# Patient Record
Sex: FEMALE | Race: ASIAN | NOT HISPANIC OR LATINO | Employment: FULL TIME | URBAN - METROPOLITAN AREA
[De-identification: names, ages, dates, MRNs, and addresses within clinical notes are randomized per-mention and may not be internally consistent; named-entity substitution may affect disease eponyms.]

---

## 2023-10-12 ENCOUNTER — TELEPHONE (OUTPATIENT)
Dept: OBSTETRICS AND GYNECOLOGY | Facility: CLINIC | Age: 28
End: 2023-10-12
Payer: COMMERCIAL

## 2023-10-12 NOTE — TELEPHONE ENCOUNTER
Patient called asking for an appointment on 10/13/23. There was nothing available. She has a bartholin cyst and has been doing warm compression 3-4 times a day for 1 day, with no relief. She is seeking advice to find out if an antibiotic would be recommended.

## 2023-10-13 ENCOUNTER — APPOINTMENT (OUTPATIENT)
Dept: OBSTETRICS AND GYNECOLOGY | Facility: CLINIC | Age: 28
End: 2023-10-13
Payer: COMMERCIAL

## 2023-10-13 ENCOUNTER — HOSPITAL ENCOUNTER (EMERGENCY)
Facility: HOSPITAL | Age: 28
Discharge: HOME | End: 2023-10-13
Payer: COMMERCIAL

## 2023-10-13 VITALS
OXYGEN SATURATION: 99 % | HEIGHT: 64 IN | DIASTOLIC BLOOD PRESSURE: 86 MMHG | RESPIRATION RATE: 20 BRPM | BODY MASS INDEX: 21.34 KG/M2 | SYSTOLIC BLOOD PRESSURE: 115 MMHG | HEART RATE: 86 BPM | WEIGHT: 125 LBS | TEMPERATURE: 98.4 F

## 2023-10-13 DIAGNOSIS — N75.0 BARTHOLIN CYST: Primary | ICD-10-CM

## 2023-10-13 PROCEDURE — 99283 EMERGENCY DEPT VISIT LOW MDM: CPT

## 2023-10-13 RX ORDER — SULFAMETHOXAZOLE AND TRIMETHOPRIM 800; 160 MG/1; MG/1
1 TABLET ORAL 2 TIMES DAILY
Qty: 20 TABLET | Refills: 0 | Status: SHIPPED | OUTPATIENT
Start: 2023-10-13 | End: 2023-10-23

## 2023-10-13 RX ORDER — CEPHALEXIN 500 MG/1
500 CAPSULE ORAL 4 TIMES DAILY
Qty: 40 CAPSULE | Refills: 0 | Status: SHIPPED | OUTPATIENT
Start: 2023-10-13 | End: 2023-10-23

## 2023-10-13 ASSESSMENT — PAIN DESCRIPTION - PAIN TYPE: TYPE: ACUTE PAIN

## 2023-10-13 ASSESSMENT — COLUMBIA-SUICIDE SEVERITY RATING SCALE - C-SSRS
1. IN THE PAST MONTH, HAVE YOU WISHED YOU WERE DEAD OR WISHED YOU COULD GO TO SLEEP AND NOT WAKE UP?: NO
2. HAVE YOU ACTUALLY HAD ANY THOUGHTS OF KILLING YOURSELF?: NO
6. HAVE YOU EVER DONE ANYTHING, STARTED TO DO ANYTHING, OR PREPARED TO DO ANYTHING TO END YOUR LIFE?: NO

## 2023-10-13 ASSESSMENT — PAIN - FUNCTIONAL ASSESSMENT: PAIN_FUNCTIONAL_ASSESSMENT: 0-10

## 2023-10-13 ASSESSMENT — PAIN DESCRIPTION - FREQUENCY: FREQUENCY: CONSTANT/CONTINUOUS

## 2023-10-13 ASSESSMENT — PAIN DESCRIPTION - LOCATION: LOCATION: VAGINA

## 2023-10-13 ASSESSMENT — PAIN DESCRIPTION - DESCRIPTORS: DESCRIPTORS: SHARP

## 2023-10-13 ASSESSMENT — PAIN SCALES - GENERAL: PAINLEVEL_OUTOF10: 8

## 2023-10-13 NOTE — ED PROVIDER NOTES
HPI   Chief Complaint   Patient presents with    Cyst       This is a 28-year-old female presenting to the emergency room with complaints of a Bartholin cyst to the left labia majora.  The patient reports that it developed over the last couple of days.  Is gotten progressively larger in size and more uncomfortable.  It hurts particularly when she is walking.  She had an appointment with OB/GYN this morning, however the pain was so intense that she cannot make it to the appointment.  She has been applying compresses to the affected area.  The patient reports that she has had these before in the past.  She has had incision and drainage in the past and has taken antibiotics.  She denies any history of STDs.  She denies shaving in that area or using any particular creams or salves.  She reports she has not been sexually active.  Patient denies any fever, chills, nausea, or vomiting.      History provided by:  Patient   used: No                        No data recorded                Patient History   No past medical history on file.  No past surgical history on file.  No family history on file.  Social History     Tobacco Use    Smoking status: Not on file    Smokeless tobacco: Not on file   Substance Use Topics    Alcohol use: Not on file    Drug use: Not on file       Physical Exam   ED Triage Vitals [10/13/23 0948]   Temp Heart Rate Resp BP   36.8 °C (98.2 °F) 90 18 113/72      SpO2 Temp Source Heart Rate Source Patient Position   99 % Temporal Monitor Standing      BP Location FiO2 (%)     Left arm --       Physical Exam  Constitutional:       Appearance: She is normal weight.   HENT:      Head: Normocephalic.      Right Ear: External ear normal.      Left Ear: External ear normal.      Nose: Nose normal.      Mouth/Throat:      Pharynx: Oropharynx is clear.   Eyes:      Extraocular Movements: Extraocular movements intact.      Pupils: Pupils are equal, round, and reactive to light.    Cardiovascular:      Rate and Rhythm: Normal rate and regular rhythm.      Pulses: Normal pulses.      Heart sounds: Normal heart sounds.   Pulmonary:      Effort: Pulmonary effort is normal.      Breath sounds: Normal breath sounds.   Abdominal:      General: Bowel sounds are normal.      Palpations: Abdomen is soft.      Tenderness: There is no abdominal tenderness.   Genitourinary:     Comments: The patient has a indurated area measuring approximately 2 cm x 1 cm to the left labia majora.  There is a small amount of fluctuance noted to the center.  It is tender to palpation.  No surrounding cellulitis.  Musculoskeletal:         General: Normal range of motion.   Skin:     General: Skin is warm.   Neurological:      General: No focal deficit present.      Mental Status: She is alert and oriented to person, place, and time.   Psychiatric:         Mood and Affect: Mood normal.         ED Course & MDM   Diagnoses as of 10/13/23 1213   Bartholin cyst       Medical Decision Making  The patient was seen and examined by the nurse practitioner, Jazmin Zacarias.  The patient has a large Bartholin noted to the left labia majora.  There is mild fluctuance.  We discussed treatment modalities.  The patient requested that we not perform an incision and drainage.  She reported that she had a significant amount of pain with her last incision and drainage.  The patient wants to try antibiotics.  We did stress that the best therapy is incision and drainage.  We are willing to provide antibiotics.  We will provide a prescription for Bactrim and Keflex for home administration.  She is to follow-up with her OB/GYN on Monday.  She is to apply warm compresses to the affected area and she will use Tylenol and/or Motrin over-the-counter as needed for pain.  The patient denied the need for stronger pain medication.  The patient was given strict return precautions.  She is to return to the emergency room if he gets worse in that amount of  time.  She was told that she will probably need incision and drainage if the antibiotics do not work appropriately.  She verbalized understanding of these instructions.  The patient was discharged in stable condition with computer discharge instructions given.        Procedure  Procedures     Jazmin Zacarias, APRN-DIAN  10/13/23 1216

## 2023-10-16 ENCOUNTER — LAB (OUTPATIENT)
Dept: LAB | Facility: LAB | Age: 28
End: 2023-10-16
Payer: COMMERCIAL

## 2023-10-16 ENCOUNTER — OFFICE VISIT (OUTPATIENT)
Dept: OBSTETRICS AND GYNECOLOGY | Facility: CLINIC | Age: 28
End: 2023-10-16
Payer: COMMERCIAL

## 2023-10-16 VITALS
SYSTOLIC BLOOD PRESSURE: 96 MMHG | WEIGHT: 138 LBS | HEIGHT: 63 IN | DIASTOLIC BLOOD PRESSURE: 64 MMHG | BODY MASS INDEX: 24.45 KG/M2

## 2023-10-16 DIAGNOSIS — Z01.419 WELL WOMAN EXAM: Primary | ICD-10-CM

## 2023-10-16 DIAGNOSIS — Z11.51 SCREENING FOR HPV (HUMAN PAPILLOMAVIRUS): ICD-10-CM

## 2023-10-16 DIAGNOSIS — N75.0 BARTHOLIN CYST: ICD-10-CM

## 2023-10-16 DIAGNOSIS — Z11.3 SCREENING FOR STD (SEXUALLY TRANSMITTED DISEASE): ICD-10-CM

## 2023-10-16 DIAGNOSIS — Z01.419 WELL WOMAN EXAM: ICD-10-CM

## 2023-10-16 DIAGNOSIS — Z12.4 CERVICAL CANCER SCREENING: ICD-10-CM

## 2023-10-16 LAB
ERYTHROCYTE [DISTWIDTH] IN BLOOD BY AUTOMATED COUNT: 12.7 % (ref 11.5–14.5)
HCT VFR BLD AUTO: 38.1 % (ref 36–46)
HGB BLD-MCNC: 12.1 G/DL (ref 12–16)
MCH RBC QN AUTO: 28.6 PG (ref 26–34)
MCHC RBC AUTO-ENTMCNC: 31.8 G/DL (ref 32–36)
MCV RBC AUTO: 90 FL (ref 80–100)
NRBC BLD-RTO: 0 /100 WBCS (ref 0–0)
PLATELET # BLD AUTO: 302 X10*3/UL (ref 150–450)
PMV BLD AUTO: 11.1 FL (ref 7.5–11.5)
RBC # BLD AUTO: 4.23 X10*6/UL (ref 4–5.2)
TSH SERPL-ACNC: 1.15 MIU/L (ref 0.44–3.98)
WBC # BLD AUTO: 5.6 X10*3/UL (ref 4.4–11.3)

## 2023-10-16 PROCEDURE — 88175 CYTOPATH C/V AUTO FLUID REDO: CPT

## 2023-10-16 PROCEDURE — 87389 HIV-1 AG W/HIV-1&-2 AB AG IA: CPT

## 2023-10-16 PROCEDURE — 84443 ASSAY THYROID STIM HORMONE: CPT

## 2023-10-16 PROCEDURE — 87624 HPV HI-RISK TYP POOLED RSLT: CPT

## 2023-10-16 PROCEDURE — 99213 OFFICE O/P EST LOW 20 MIN: CPT | Performed by: STUDENT IN AN ORGANIZED HEALTH CARE EDUCATION/TRAINING PROGRAM

## 2023-10-16 PROCEDURE — 36415 COLL VENOUS BLD VENIPUNCTURE: CPT

## 2023-10-16 PROCEDURE — 87661 TRICHOMONAS VAGINALIS AMPLIF: CPT

## 2023-10-16 PROCEDURE — 87800 DETECT AGNT MULT DNA DIREC: CPT

## 2023-10-16 PROCEDURE — 85027 COMPLETE CBC AUTOMATED: CPT

## 2023-10-16 PROCEDURE — 1036F TOBACCO NON-USER: CPT | Performed by: STUDENT IN AN ORGANIZED HEALTH CARE EDUCATION/TRAINING PROGRAM

## 2023-10-16 NOTE — ASSESSMENT & PLAN NOTE
-Doing well on Abx. Discussed Word catheter as this is a recurrent issue and needs more definitive management. Pt will call and come in if recurrence occurs.

## 2023-10-16 NOTE — PROGRESS NOTES
Subjective   Patient ID: Eryn Tay is a 28 y.o. female who presents for established obgyn visit (ER 10-13-23 Bartholin cyst /Patient is on antibiotics and feeling better ).  Pt seen in ED for Bartholin cyst. This is a recurrent issue. She was seen and placed on Abx. The cyst spontaneously drained 2 days ago. She has complete resolution of pain and symptoms. No fevers, chills. No HA/vision changes. No RUQ pain, n/v. No chest pain or SOB. No calf pain.          Review of Systems   All other systems reviewed and are negative.    History reviewed. No pertinent past medical history.  History reviewed. No pertinent surgical history.  Social History     Socioeconomic History    Marital status: Single     Spouse name: Not on file    Number of children: Not on file    Years of education: Not on file    Highest education level: Not on file   Occupational History    Not on file   Tobacco Use    Smoking status: Never    Smokeless tobacco: Never   Substance and Sexual Activity    Alcohol use: Yes     Comment: occasional    Drug use: Never    Sexual activity: Not on file   Other Topics Concern    Not on file   Social History Narrative    Not on file     Social Determinants of Health     Financial Resource Strain: Not on file   Food Insecurity: Not on file   Transportation Needs: Not on file   Physical Activity: Not on file   Stress: Not on file   Social Connections: Not on file   Intimate Partner Violence: Not on file   Housing Stability: Not on file         Objective   Physical Exam  General: A&Ox3  Head: Normocephalic, atraumatic  Heart/Lungs: Even chest rise, no increased work of breathing.  Abdomen: Soft, nontender. BS+4. No bruising or masses.  Genitourinary: Labia and vagina normal in appearance. Uterus is mobile, anteverted. No adnexal masses palpated. No bartholin abscess noted. Nontender.  Lower Extremities: No lower extremity Edema no palpable cords.     Assessment/Plan   Problem List Items Addressed This Visit              ICD-10-CM    Bartholin cyst N75.0     -Doing well on Abx. Discussed Word catheter as this is a recurrent issue and needs more definitive management. Pt will call and come in if recurrence occurs.          Other Visit Diagnoses         Codes    Well woman exam    -  Primary Z01.419    Relevant Orders    CBC    TSH    HIV 1/2 Antigen/Antibody Screen with Reflex to Confirmation    Cervical cancer screening     Z12.4    Relevant Orders    THINPREP PAP    Screening for HPV (human papillomavirus)     Z11.51    Relevant Orders    THINPREP PAP    Screening for STD (sexually transmitted disease)     Z11.3    Relevant Orders    THINPREP PAP          We did discuss pap smear and STI testing. Pt would like to have both. Discussed HPV testing and recommended HPV vaccine, pt is considering it. Will administer next visit if pt is amenable to it.

## 2023-10-17 LAB
C TRACH RRNA SPEC QL NAA+PROBE: NEGATIVE
HIV 1+2 AB+HIV1 P24 AG SERPL QL IA: NONREACTIVE
N GONORRHOEA DNA SPEC QL PROBE+SIG AMP: NEGATIVE
T VAGINALIS RRNA SPEC QL NAA+PROBE: NEGATIVE

## 2023-10-27 LAB
CYTOLOGY CMNT CVX/VAG CYTO-IMP: NORMAL
HPV HR GENOTYPES PNL CVX NAA+PROBE: NEGATIVE
HPV HR GENOTYPES PNL CVX NAA+PROBE: NEGATIVE
HPV16 DNA SPEC QL NAA+PROBE: NEGATIVE
HPV18 DNA SPEC QL NAA+PROBE: NEGATIVE
LAB AP HPV GENOTYPE QUESTION: YES
LAB AP HPV HR: NORMAL
LAB AP PAP ADDITIONAL TESTS: NORMAL
LABORATORY COMMENT REPORT: NORMAL
LMP START DATE: NORMAL
PATH REPORT.TOTAL CANCER: NORMAL

## 2023-11-10 PROBLEM — L02.92 BOIL: Status: ACTIVE | Noted: 2023-11-10

## 2023-11-13 ENCOUNTER — APPOINTMENT (OUTPATIENT)
Dept: OBSTETRICS AND GYNECOLOGY | Facility: CLINIC | Age: 28
End: 2023-11-13
Payer: COMMERCIAL

## 2023-11-28 ENCOUNTER — LAB (OUTPATIENT)
Dept: LAB | Facility: LAB | Age: 28
End: 2023-11-28
Payer: COMMERCIAL

## 2023-11-28 ENCOUNTER — OFFICE VISIT (OUTPATIENT)
Dept: OBSTETRICS AND GYNECOLOGY | Facility: CLINIC | Age: 28
End: 2023-11-28
Payer: COMMERCIAL

## 2023-11-28 VITALS — DIASTOLIC BLOOD PRESSURE: 70 MMHG | WEIGHT: 138.2 LBS | SYSTOLIC BLOOD PRESSURE: 100 MMHG | BODY MASS INDEX: 24.87 KG/M2

## 2023-11-28 DIAGNOSIS — Z13.71 SCREENING FOR GENETIC DISEASE CARRIER STATUS: Primary | ICD-10-CM

## 2023-11-28 DIAGNOSIS — Z13.71 SCREENING FOR GENETIC DISEASE CARRIER STATUS: ICD-10-CM

## 2023-11-28 DIAGNOSIS — Z31.69 ENCOUNTER FOR PRECONCEPTION CONSULTATION: ICD-10-CM

## 2023-11-28 PROCEDURE — 1036F TOBACCO NON-USER: CPT | Performed by: STUDENT IN AN ORGANIZED HEALTH CARE EDUCATION/TRAINING PROGRAM

## 2023-11-28 PROCEDURE — 36415 COLL VENOUS BLD VENIPUNCTURE: CPT

## 2023-11-28 PROCEDURE — 99213 OFFICE O/P EST LOW 20 MIN: CPT | Performed by: STUDENT IN AN ORGANIZED HEALTH CARE EDUCATION/TRAINING PROGRAM

## 2023-11-28 NOTE — PROGRESS NOTES
Subjective   Patient ID: Eryn Tay is a 28 y.o. female who presents for Fertility consult (Fertility consult).  Patient is single and is here to plan ahead for her fertility. She is working as a . She would like to plan ahead for pregnancy. She has regular cycles. No hx of infertility or endometriosis in her family. No hx of STI are PID. She is doing well, but has some pressure from her family to have children.        Review of Systems   All other systems reviewed and are negative.      Objective   Physical Exam  General: A&Ox3  Head: Normocephalic, atraumatic  Heart/Lungs: Even chest rise, no increased work of breathing.    Abdomen: Soft, nontender. BS+4. No bruising or masses.  Lower Extremities: No lower extremity Edema no palpable cords.     Assessment/Plan   Problem List Items Addressed This Visit             ICD-10-CM    Encounter for preconception consultation Z31.69     Carrier screening testing ordered.  She has no risk factors for infertility. We discussed timed intercourse for when she does want to try to get pregnant. Discussed starting.   She is considering freezing some eggs, but isn't sure yet for the cost.           Other Visit Diagnoses         Codes    Screening for genetic disease carrier status    -  Primary Z13.71    Relevant Orders    [facility] Carrier Screen

## 2023-11-28 NOTE — ASSESSMENT & PLAN NOTE
Carrier screening testing ordered.  She has no risk factors for infertility. We discussed timed intercourse for when she does want to try to get pregnant. Discussed starting.   She is considering freezing some eggs, but isn't sure yet for the cost.